# Patient Record
Sex: MALE | Race: WHITE | NOT HISPANIC OR LATINO | Employment: STUDENT | URBAN - METROPOLITAN AREA
[De-identification: names, ages, dates, MRNs, and addresses within clinical notes are randomized per-mention and may not be internally consistent; named-entity substitution may affect disease eponyms.]

---

## 2023-05-26 ENCOUNTER — OFFICE VISIT (OUTPATIENT)
Dept: PEDIATRICS | Facility: CLINIC | Age: 18
End: 2023-05-26
Payer: COMMERCIAL

## 2023-05-26 VITALS
WEIGHT: 200.2 LBS | HEART RATE: 59 BPM | BODY MASS INDEX: 30.89 KG/M2 | DIASTOLIC BLOOD PRESSURE: 71 MMHG | SYSTOLIC BLOOD PRESSURE: 109 MMHG

## 2023-05-26 DIAGNOSIS — E23.0 HYPOGONADOTROPIC HYPOGONADISM IN MALE (MULTI): Primary | ICD-10-CM

## 2023-05-26 PROBLEM — Q53.9 UNDESCENDED TESTIS: Status: ACTIVE | Noted: 2023-05-26

## 2023-05-26 PROBLEM — E78.00 ELEVATED LDL CHOLESTEROL LEVEL: Status: ACTIVE | Noted: 2023-05-26

## 2023-05-26 PROBLEM — R43.8 HYPOSMIA: Status: ACTIVE | Noted: 2023-05-26

## 2023-05-26 PROBLEM — Q53.10 UNDESCENDED RIGHT TESTICLE: Status: ACTIVE | Noted: 2023-05-26

## 2023-05-26 PROBLEM — M89.8X9 DELAYED BONE AGE DETERMINED BY X-RAY: Status: ACTIVE | Noted: 2023-05-26

## 2023-05-26 PROBLEM — F90.9 ADHD (ATTENTION DEFICIT HYPERACTIVITY DISORDER): Status: ACTIVE | Noted: 2023-05-26

## 2023-05-26 PROBLEM — E30.0 DELAYED PUBERTY: Status: ACTIVE | Noted: 2023-05-26

## 2023-05-26 PROBLEM — R93.7 DELAYED BONE AGE DETERMINED BY X-RAY: Status: ACTIVE | Noted: 2023-05-26

## 2023-05-26 PROCEDURE — 96372 THER/PROPH/DIAG INJ SC/IM: CPT | Performed by: NURSE PRACTITIONER

## 2023-05-26 PROCEDURE — 99212 OFFICE O/P EST SF 10 MIN: CPT | Performed by: NURSE PRACTITIONER

## 2023-05-26 RX ORDER — TESTOSTERONE CYPIONATE 200 MG/ML
INJECTION, SOLUTION INTRAMUSCULAR
COMMUNITY
Start: 2022-05-27

## 2023-05-26 RX ORDER — METHYLPHENIDATE HYDROCHLORIDE 18 MG/1
TABLET ORAL
COMMUNITY
Start: 2013-11-10 | End: 2023-10-30 | Stop reason: ALTCHOICE

## 2023-05-26 RX ORDER — TESTOSTERONE CYPIONATE 200 MG/ML
200 INJECTION, SOLUTION INTRAMUSCULAR ONCE
Status: COMPLETED | OUTPATIENT
Start: 2023-05-26 | End: 2023-05-26

## 2023-05-26 RX ORDER — TESTOSTERONE CYPIONATE 200 MG/ML
INJECTION, SOLUTION INTRAMUSCULAR
COMMUNITY
Start: 2020-11-16

## 2023-05-26 RX ORDER — LEVOTHYROXINE SODIUM 75 UG/1
TABLET ORAL
COMMUNITY
Start: 2023-05-25 | End: 2024-01-09 | Stop reason: DRUGHIGH

## 2023-05-26 RX ADMIN — TESTOSTERONE CYPIONATE 200 MG: 200 INJECTION, SOLUTION INTRAMUSCULAR at 14:06

## 2023-05-26 NOTE — PROGRESS NOTES
Subjective     Homero Ramsey is a 18 y.o. male who presents for Immunizations (18 yr old here for testosterone injection).  Today he is accompanied by alone.     HPI  Patient is here today for testosterone   Patient received 200 mg/1 mL  Patient brought own medication    Review of Systems  ROS negative for General, Eyes, ENT, Cardiovascular, GI, , Ortho, Derm, Neuro, Psych, Lymph unless noted in the HPI above.     Objective   /71   Pulse 59   Wt 90.8 kg (200 lb 3.2 oz)   BMI 30.89 kg/m²   BSA: 2.08 meters squared  Growth percentiles: Facility age limit for growth %david is 18 years. Facility age limit for growth %david is 18 years.     Physical Exam  General: Well-developed, well-nourished, alert and oriented, no acute distress  Eyes: Normal sclera, PERRLA, EOMI  ENT: Normal throat, no nasal discharge, TM's appear normal.  Cardiac: Regular rate and rhythm, normal S1/S2, no murmurs.  Pulmonary: Clear to auscultation bilaterally, no work of breathing.  GI: Soft nondistended nontender abdomen without rebound or guarding.  Skin: No rashes     Assessment/Plan   Diagnoses and all orders for this visit:  Hypogonadotropic hypogonadism in male (CMS/HCC)  -     testosterone cypionate (Depo-Testosterone) injection 200 mg      VICENTE Maloney-CNP

## 2023-06-16 ENCOUNTER — OFFICE VISIT (OUTPATIENT)
Dept: PEDIATRICS | Facility: CLINIC | Age: 18
End: 2023-06-16
Payer: COMMERCIAL

## 2023-06-16 VITALS
BODY MASS INDEX: 31.36 KG/M2 | HEART RATE: 67 BPM | WEIGHT: 203.2 LBS | SYSTOLIC BLOOD PRESSURE: 123 MMHG | DIASTOLIC BLOOD PRESSURE: 70 MMHG

## 2023-06-16 DIAGNOSIS — E23.0 HYPOGONADOTROPIC HYPOGONADISM IN MALE (MULTI): Primary | ICD-10-CM

## 2023-06-16 PROCEDURE — 96372 THER/PROPH/DIAG INJ SC/IM: CPT | Performed by: NURSE PRACTITIONER

## 2023-06-16 RX ORDER — TESTOSTERONE CYPIONATE 200 MG/ML
200 INJECTION, SOLUTION INTRAMUSCULAR ONCE
Status: COMPLETED | OUTPATIENT
Start: 2023-06-16 | End: 2023-06-16

## 2023-06-16 RX ADMIN — TESTOSTERONE CYPIONATE 200 MG: 200 INJECTION, SOLUTION INTRAMUSCULAR at 14:08

## 2023-06-16 NOTE — PROGRESS NOTES
Subjective     Homero Ramsey is a 18 y.o. male who presents for Testosterone injection (18 yr old here for testosterone injection. Bring medicine from pharmacy.).  Today he is accompanied by alone.     HPI  Patient here for testosterone injection as directed by endocrinology  Patient brought medication with him - 200 mg/1 mL  Medication verified and administered per order    Review of Systems  ROS negative for General, Eyes, ENT, Cardiovascular, GI, , Ortho, Derm, Neuro, Psych, Lymph unless noted in the HPI above.     Objective   /70   Pulse 67   Wt 92.2 kg (203 lb 3.2 oz)   BMI 31.36 kg/m²   BSA: 2.1 meters squared  Growth percentiles: Facility age limit for growth %david is 20 years. Facility age limit for growth %david is 18 years.     Physical Exam  General: Well-developed, well-nourished, alert and oriented, no acute distress  Cardiac: Regular rate and rhythm, normal S1/S2, no murmurs.  Pulmonary: Clear to auscultation bilaterally, no work of breathing.    Assessment/Plan   Diagnoses and all orders for this visit:  Hypogonadotropic hypogonadism in male (CMS/[facility])  -     testosterone cypionate (Depo-Testosterone) injection 200 mg      Becky Antunez, APRN-CNP

## 2023-07-07 ENCOUNTER — OFFICE VISIT (OUTPATIENT)
Dept: PEDIATRICS | Facility: CLINIC | Age: 18
End: 2023-07-07
Payer: COMMERCIAL

## 2023-07-07 VITALS
BODY MASS INDEX: 30.79 KG/M2 | DIASTOLIC BLOOD PRESSURE: 65 MMHG | WEIGHT: 201.5 LBS | HEART RATE: 75 BPM | SYSTOLIC BLOOD PRESSURE: 105 MMHG

## 2023-07-07 DIAGNOSIS — E23.0 HYPOGONADOTROPIC HYPOGONADISM IN MALE (MULTI): Primary | ICD-10-CM

## 2023-07-07 PROCEDURE — 99212 OFFICE O/P EST SF 10 MIN: CPT | Performed by: NURSE PRACTITIONER

## 2023-07-07 PROCEDURE — 96372 THER/PROPH/DIAG INJ SC/IM: CPT | Performed by: NURSE PRACTITIONER

## 2023-07-07 RX ORDER — TESTOSTERONE CYPIONATE 200 MG/ML
200 INJECTION, SOLUTION INTRAMUSCULAR ONCE
Status: COMPLETED | OUTPATIENT
Start: 2023-07-07 | End: 2023-07-07

## 2023-07-07 RX ADMIN — TESTOSTERONE CYPIONATE 200 MG: 200 INJECTION, SOLUTION INTRAMUSCULAR at 12:08

## 2023-07-07 NOTE — PROGRESS NOTES
Subjective     Homero Ramsey is a 18 y.o. male who presents for Testosterone injection (18 yr old here for testosterone injection. Brought medicine from pharmacy to be administered; 200 mg/1 mL per order/).  Today he is accompanied by alone.     HPI  Patient is here for testosterone injection  He brought medication with him  200 mg/1 mL given per endocrine order - tolerated well  Return in 3 weeks for next injection    Review of Systems  ROS negative for General, Eyes, ENT, Cardiovascular, GI, , Ortho, Derm, Neuro, Psych, Lymph unless noted in the HPI above.     Objective   /65   Pulse 75   Wt 91.4 kg (201 lb 8 oz)   BMI 30.79 kg/m²   BSA: 2.09 meters squared  Growth percentiles: Facility age limit for growth %david is 20 years. Facility age limit for growth %david is 18 years.     Physical Exam  General: Well-developed, well-nourished, alert and oriented, no acute distress  Cardiac: Regular rate and rhythm, normal S1/S2, no murmurs.  Pulmonary: Clear to auscultation bilaterally, no work of breathing.    Assessment/Plan   Diagnoses and all orders for this visit:  Hypogonadotropic hypogonadism in male (CMS/[facility])  -     testosterone cypionate (Depo-Testosterone) injection 200 mg      Becky Antunez, VICENTE-CNP

## 2023-07-25 ENCOUNTER — OFFICE VISIT (OUTPATIENT)
Dept: PEDIATRICS | Facility: CLINIC | Age: 18
End: 2023-07-25
Payer: COMMERCIAL

## 2023-07-25 VITALS
SYSTOLIC BLOOD PRESSURE: 121 MMHG | HEART RATE: 73 BPM | DIASTOLIC BLOOD PRESSURE: 77 MMHG | TEMPERATURE: 97.8 F | WEIGHT: 206.4 LBS | BODY MASS INDEX: 31.54 KG/M2

## 2023-07-25 DIAGNOSIS — E23.0 HYPOGONADOTROPIC HYPOGONADISM IN MALE (MULTI): Primary | ICD-10-CM

## 2023-07-25 DIAGNOSIS — E30.0 DELAYED PUBERTY: ICD-10-CM

## 2023-07-25 PROCEDURE — 99212 OFFICE O/P EST SF 10 MIN: CPT | Performed by: NURSE PRACTITIONER

## 2023-07-25 PROCEDURE — 96372 THER/PROPH/DIAG INJ SC/IM: CPT | Performed by: NURSE PRACTITIONER

## 2023-07-25 RX ORDER — TESTOSTERONE CYPIONATE 200 MG/ML
200 INJECTION, SOLUTION INTRAMUSCULAR ONCE
Status: COMPLETED | OUTPATIENT
Start: 2023-07-25 | End: 2023-07-25

## 2023-07-25 RX ADMIN — TESTOSTERONE CYPIONATE 200 MG: 200 INJECTION, SOLUTION INTRAMUSCULAR at 10:34

## 2023-07-25 NOTE — PROGRESS NOTES
Subjective     Homero Ramsey is a 18 y.o. male who presents for Immunizations (Patient is 18 yrs old here for Testosterone shot ).  Today he is accompanied by alone.     HPI  Patient here today for testosterone injection  200 mg/1 mL given per order from endocrine  Patient brought own medication  Medication and dosage verified    Review of Systems  ROS negative for General, Eyes, ENT, Cardiovascular, GI, , Ortho, Derm, Neuro, Psych, Lymph unless noted in the HPI above.     Objective   /77   Pulse 73   Temp 36.6 °C (97.8 °F)   Wt 93.6 kg (206 lb 6.4 oz)   BMI 31.54 kg/m²   BSA: 2.12 meters squared  Growth percentiles: Facility age limit for growth %david is 20 years. Facility age limit for growth %david is 20 years.     Physical Exam  General: Well-developed, well-nourished, alert and oriented, no acute distress  Cardiac: Regular rate and rhythm, normal S1/S2, no murmurs.  Pulmonary: Clear to auscultation bilaterally, no work of breathing.    Assessment/Plan   Diagnoses and all orders for this visit:  Hypogonadotropic hypogonadism in male (CMS/HCC)  -     testosterone cypionate (Depo-Testosterone) injection 200 mg  Delayed puberty      Becky Antunez, APRN-CNP

## 2023-08-15 ENCOUNTER — OFFICE VISIT (OUTPATIENT)
Dept: PEDIATRICS | Facility: CLINIC | Age: 18
End: 2023-08-15
Payer: COMMERCIAL

## 2023-08-15 DIAGNOSIS — E23.0 HYPOGONADOTROPIC HYPOGONADISM IN MALE (MULTI): ICD-10-CM

## 2023-08-15 PROCEDURE — 96372 THER/PROPH/DIAG INJ SC/IM: CPT | Performed by: NURSE PRACTITIONER

## 2023-08-15 PROCEDURE — 99213 OFFICE O/P EST LOW 20 MIN: CPT | Performed by: NURSE PRACTITIONER

## 2023-08-15 RX ORDER — TESTOSTERONE CYPIONATE 200 MG/ML
200 INJECTION, SOLUTION INTRAMUSCULAR ONCE
Status: COMPLETED | OUTPATIENT
Start: 2023-08-15 | End: 2023-08-15

## 2023-08-15 RX ADMIN — TESTOSTERONE CYPIONATE 200 MG: 200 INJECTION, SOLUTION INTRAMUSCULAR at 17:07

## 2023-10-27 ENCOUNTER — DOCUMENTATION (OUTPATIENT)
Dept: PEDIATRIC ENDOCRINOLOGY | Facility: CLINIC | Age: 18
End: 2023-10-27
Payer: COMMERCIAL

## 2023-10-27 NOTE — PROGRESS NOTES
Max had labs done at [facility] (see scanned)    Lipid panel is normal aside from LDL-123. This is acceptable    CBC and CMP generally normal.    AM cortisol reassuring at 16.8    TSH normal at 0.66 but free T4 not done.     Testosterone is 973 (high) however he had labs done 10/12 and likely just received his injection.     Radha Beck MD

## 2023-10-30 DIAGNOSIS — E03.8 CENTRAL HYPOTHYROIDISM: Primary | ICD-10-CM

## 2023-12-13 ENCOUNTER — TELEPHONE (OUTPATIENT)
Dept: PEDIATRICS | Facility: CLINIC | Age: 18
End: 2023-12-13
Payer: COMMERCIAL

## 2023-12-13 NOTE — TELEPHONE ENCOUNTER
Homero is scheduled for a testosterone shot on 1-2-24  but it fell on to the reschedule list as it's the day after a holiday. OK to keep on the schedule or do you want me to reschedule?  Thx!

## 2023-12-19 ENCOUNTER — OFFICE VISIT (OUTPATIENT)
Dept: PEDIATRICS | Facility: CLINIC | Age: 18
End: 2023-12-19
Payer: COMMERCIAL

## 2023-12-19 VITALS
DIASTOLIC BLOOD PRESSURE: 72 MMHG | HEIGHT: 69 IN | SYSTOLIC BLOOD PRESSURE: 126 MMHG | HEART RATE: 66 BPM | BODY MASS INDEX: 30.81 KG/M2 | WEIGHT: 208 LBS

## 2023-12-19 DIAGNOSIS — E23.0 HYPOGONADOTROPIC HYPOGONADISM IN MALE (MULTI): Primary | ICD-10-CM

## 2023-12-19 PROCEDURE — 96372 THER/PROPH/DIAG INJ SC/IM: CPT | Performed by: NURSE PRACTITIONER

## 2023-12-19 PROCEDURE — 99212 OFFICE O/P EST SF 10 MIN: CPT | Performed by: NURSE PRACTITIONER

## 2023-12-19 RX ORDER — TESTOSTERONE CYPIONATE 200 MG/ML
200 INJECTION, SOLUTION INTRAMUSCULAR ONCE
Status: COMPLETED | OUTPATIENT
Start: 2023-12-19 | End: 2024-01-02

## 2023-12-19 RX ADMIN — TESTOSTERONE CYPIONATE 200 MG: 200 INJECTION, SOLUTION INTRAMUSCULAR at 09:45

## 2023-12-19 NOTE — PROGRESS NOTES
"Subjective     Homero Ramsey is a 18 y.o. male who presents for Testosterone Injection (Testosterone Injection/ Patient here by Himself).  Today he is accompanied by alone.     HPI  Patient here for testosterone injection  200 mg/1 mL - patient brought medication  Medication verified    Review of Systems  ROS negative for General, Eyes, ENT, Cardiovascular, GI, , Ortho, Derm, Neuro, Psych, Lymph unless noted in the HPI above.     Objective   /72   Pulse 66   Ht 1.74 m (5' 8.5\")   Wt 94.3 kg (208 lb)   BMI 31.17 kg/m²   BSA: 2.13 meters squared  Growth percentiles: Facility age limit for growth %david is 20 years. Facility age limit for growth %david is 18 years.     Physical Exam  General: Well-developed, well-nourished, alert and oriented, no acute distress  Cardiac: Regular rate and rhythm, normal S1/S2, no murmurs.  Pulmonary: Clear to auscultation bilaterally, no work of breathing.    Assessment/Plan   Diagnoses and all orders for this visit:  Hypogonadotropic hypogonadism in male (CMS/[facility])  -     testosterone cypionate (Depo-Testosterone) injection 200 mg      VICENTE Maloney-CNP   "

## 2023-12-28 LAB — NON-UH HIE FREE T4: 0.91 NG/DL (ref 0.89–1.76)

## 2024-01-02 ENCOUNTER — OFFICE VISIT (OUTPATIENT)
Dept: PEDIATRICS | Facility: CLINIC | Age: 19
End: 2024-01-02
Payer: COMMERCIAL

## 2024-01-02 VITALS
HEART RATE: 80 BPM | HEIGHT: 68 IN | BODY MASS INDEX: 31.37 KG/M2 | WEIGHT: 207 LBS | SYSTOLIC BLOOD PRESSURE: 131 MMHG | DIASTOLIC BLOOD PRESSURE: 70 MMHG

## 2024-01-02 DIAGNOSIS — E23.0 HYPOGONADOTROPIC HYPOGONADISM IN MALE (MULTI): Primary | ICD-10-CM

## 2024-01-02 DIAGNOSIS — E30.0 DELAYED PUBERTY: ICD-10-CM

## 2024-01-02 PROCEDURE — 96372 THER/PROPH/DIAG INJ SC/IM: CPT | Performed by: NURSE PRACTITIONER

## 2024-01-02 RX ORDER — TESTOSTERONE CYPIONATE 200 MG/ML
200 INJECTION, SOLUTION INTRAMUSCULAR ONCE
Status: COMPLETED | OUTPATIENT
Start: 2024-01-02 | End: 2024-01-02

## 2024-01-02 RX ADMIN — TESTOSTERONE CYPIONATE 200 MG: 200 INJECTION, SOLUTION INTRAMUSCULAR at 10:26

## 2024-01-02 RX ADMIN — TESTOSTERONE CYPIONATE 200 MG: 200 INJECTION, SOLUTION INTRAMUSCULAR at 10:22

## 2024-01-02 NOTE — PROGRESS NOTES
Patient her for testosterone injection.  Patient brought own medication.  200 mg of testosterone given per order.

## 2024-01-08 NOTE — PROGRESS NOTES
"Subjective   Homero Ramsey is a 18 y.o. 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 He also has OBESITY (BMI 30).    Initial History:   Vijay presented at age 15yrs with no signs of puberty. He has a h/o right undescended testis. Height was at 1%le at presentation (MPH 5'8.5\") with BA 14yrs at CA 15yrs (PAH 5'9.5\"). FMH significant for late bloomers. Brain MRI showed a [id number] cyst in the pituitary gland and retrocerebellar signal collection. He was referred to Dr. Sanchez who recommended repeat MRI in spring-summer 2022. Vijay also saw Dr. Peres who performed orchiopexy in June 2020. He saw Dr. 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      In May 2023, he was started on levothyroxine 75mcg daily for persistently low free T4.    Interval History:   - no recent illnesses  - no issues with meds  - getting shots at Dr. Antunez's office or [facility]    Testosterone 200mg IM every 2 weeks.   - testosterone is holding him to the next dose; no symptoms of low energy before dose is due    Levothyroxine 75mcg daily  - usually takes in morning; if forgets takes two the next day  - waits 30 minutes  - no skin or hair changes  - no constipation     Labs 12/29:   Free T4 0.91    Adrenal Insufficiency Risk:  - no orthostatic hypotension  - not often adrenal insufficiency     Wt up a few lbs, discussed diet/activity    Diet; Trying to avoid chips and candy; avoiding fast food. Not too much soda or juice.     Discussed testicular self-exams, discussed transition after college    SOC: plays video games; on break from hospitals; third year at \[employer]"; wants to go work as an .     ROS: no fever, headache, chest pain, " "trouble breathing, abdominal pain, constipation, diarrhea, skin issues, joint pain  Sleep is okay     Objective   /61 (BP Location: Left arm, Patient Position: Sitting, BP Cuff Size: Large adult)   Ht 1.731 m (5' 8.15\")   Wt 95.6 kg (210 lb 12.2 oz)   BMI 31.91 kg/m²     Physical Exam:  Physical Exam  General: well appearing male in no distress  HEENT: normocephalic, atraumatic, non-dysmorphic  Teeth: good dentition  Thyroid: non-enlarged thyroid gland with no masses, no cervical lymphadenopathy  Neck: no acanthosis  CV: Normal S1, S2, Regular rate and rhythm  Resp: non-labored breathing, clear to auscultation  Abdomen: soft, non tender, no organomegaly   : deferred today  Skin: + rash on hands, dry skin with erythematous plaques eczematoid  Neuro: normal tone, grossly normal movements  Muscular: normal bulk    Labs:  Labs 12/29:   Free T4 0.91    Prior labs 10/24/23 (see below snapshot of key labs)             Assessment/Plan   Assessment:  Homero Ramsey is a 18 y.o. 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 He had dyslipidemia that improved after starting levothyroxine. His testosterone dose is appropriate, but free T4 remains low on levothyroxine 75mcg daily so will increase this dose. Central hypothyroidism is not classically described with ANOS1 gene variant, so monitoring for other pituitary hormone deficiencies is merited.  Vijay describes no symptoms to suggest adrenal insufficiency.     Plan:   Kallman syndrome (CMS/HCC)  Hypogonadotropic hypogonadism in male (CMS/HCC)  -    continue IM testosterone 200mg every 2 weeks  -     Testosterone,Total, MS; Future  Central hypothyroidism  -     Thyroxine, Free in 1 month after starting new dose for 1 month  -     levothyroxine (Synthroid, Levoxyl) 100 mcg tablet; Take 1 tablet (100 mcg) by mouth once daily.         -    follow up in 6 mos      Radha ALFONSO" MD [place]

## 2024-01-09 ENCOUNTER — OFFICE VISIT (OUTPATIENT)
Dept: PEDIATRIC ENDOCRINOLOGY | Facility: CLINIC | Age: 19
End: 2024-01-09
Payer: COMMERCIAL

## 2024-01-09 VITALS
HEIGHT: 68 IN | WEIGHT: 210.76 LBS | BODY MASS INDEX: 31.94 KG/M2 | SYSTOLIC BLOOD PRESSURE: 108 MMHG | DIASTOLIC BLOOD PRESSURE: 61 MMHG

## 2024-01-09 DIAGNOSIS — R43.8 HYPOSMIA: ICD-10-CM

## 2024-01-09 DIAGNOSIS — E03.8 CENTRAL HYPOTHYROIDISM: ICD-10-CM

## 2024-01-09 DIAGNOSIS — E23.0 HYPOGONADOTROPIC HYPOGONADISM IN MALE (MULTI): ICD-10-CM

## 2024-01-09 DIAGNOSIS — E23.0 KALLMAN SYNDROME (MULTI): Primary | ICD-10-CM

## 2024-01-09 PROCEDURE — 99214 OFFICE O/P EST MOD 30 MIN: CPT | Performed by: PEDIATRICS

## 2024-01-09 RX ORDER — LEVOTHYROXINE SODIUM 100 UG/1
100 TABLET ORAL DAILY
Qty: 30 TABLET | Refills: 11 | Status: SHIPPED | OUTPATIENT
Start: 2024-01-09 | End: 2024-01-15 | Stop reason: SDUPTHER

## 2024-01-09 RX ORDER — LEVOTHYROXINE SODIUM 100 UG/1
100 TABLET ORAL DAILY
Qty: 30 TABLET | Refills: 11 | Status: SHIPPED | OUTPATIENT
Start: 2024-01-09 | End: 2024-01-09 | Stop reason: SDUPTHER

## 2024-01-09 NOTE — PATIENT INSTRUCTIONS
Continue Testosterone 200mg IM every 2 weeks    Increase levothyroxine to 100mcg daily. Check thyroid labs in 1 month    Watch out for symptoms of adrenal insufficiency- morning stomach aches, low blood sugar, feeling dizzy a lot. Call me if you have these symptoms.     Contact Information for Pediatric Endocrinology:   Daytime Number: [phone]  Night/Weekend (emergencies): [phone]  Please do not send my-chart messages for urgent issues    Follow up in 6 months    Planning transition to adult care after graduation once you know where you will be living

## 2024-01-15 DIAGNOSIS — E03.8 CENTRAL HYPOTHYROIDISM: ICD-10-CM

## 2024-01-15 RX ORDER — LEVOTHYROXINE SODIUM 100 UG/1
100 TABLET ORAL DAILY
Qty: 90 TABLET | Refills: 3 | Status: SHIPPED | OUTPATIENT
Start: 2024-01-15 | End: 2025-01-14

## 2024-02-12 DIAGNOSIS — E23.0 KALLMAN SYNDROME (MULTI): ICD-10-CM

## 2024-02-12 RX ORDER — TESTOSTERONE CYPIONATE 200 MG/ML
200 INJECTION, SOLUTION INTRAMUSCULAR
Qty: 6 ML | Refills: 0 | Status: SHIPPED | OUTPATIENT
Start: 2024-02-12 | End: 2024-02-20 | Stop reason: SDUPTHER

## 2024-02-20 DIAGNOSIS — E23.0 KALLMAN SYNDROME (MULTI): ICD-10-CM

## 2024-02-20 RX ORDER — TESTOSTERONE CYPIONATE 200 MG/ML
200 INJECTION, SOLUTION INTRAMUSCULAR
Qty: 6 ML | Refills: 0 | Status: SHIPPED | OUTPATIENT
Start: 2024-02-20 | End: 2024-02-22 | Stop reason: SDUPTHER

## 2024-02-22 RX ORDER — TESTOSTERONE CYPIONATE 200 MG/ML
200 INJECTION, SOLUTION INTRAMUSCULAR
Qty: 6 ML | Refills: 0 | Status: SHIPPED | OUTPATIENT
Start: 2024-02-22 | End: 2024-03-22 | Stop reason: SDUPTHER

## 2024-03-22 DIAGNOSIS — E23.0 KALLMAN SYNDROME (MULTI): ICD-10-CM

## 2024-03-22 RX ORDER — TESTOSTERONE CYPIONATE 200 MG/ML
200 INJECTION, SOLUTION INTRAMUSCULAR
Qty: 6 ML | Refills: 0 | Status: SHIPPED | OUTPATIENT
Start: 2024-03-22 | End: 2024-06-20

## 2024-03-26 ENCOUNTER — OFFICE VISIT (OUTPATIENT)
Dept: PEDIATRICS | Facility: CLINIC | Age: 19
End: 2024-03-26
Payer: COMMERCIAL

## 2024-03-26 VITALS
SYSTOLIC BLOOD PRESSURE: 124 MMHG | DIASTOLIC BLOOD PRESSURE: 74 MMHG | HEIGHT: 68 IN | HEART RATE: 74 BPM | WEIGHT: 206 LBS | BODY MASS INDEX: 31.22 KG/M2

## 2024-03-26 DIAGNOSIS — Z00.00 WELLNESS EXAMINATION: ICD-10-CM

## 2024-03-26 DIAGNOSIS — E30.0 DELAYED PUBERTY: Primary | ICD-10-CM

## 2024-03-26 PROCEDURE — 96372 THER/PROPH/DIAG INJ SC/IM: CPT | Performed by: NURSE PRACTITIONER

## 2024-03-26 PROCEDURE — 99395 PREV VISIT EST AGE 18-39: CPT | Performed by: NURSE PRACTITIONER

## 2024-03-26 PROCEDURE — 3008F BODY MASS INDEX DOCD: CPT | Performed by: NURSE PRACTITIONER

## 2024-03-26 PROCEDURE — 1036F TOBACCO NON-USER: CPT | Performed by: NURSE PRACTITIONER

## 2024-03-26 RX ORDER — TESTOSTERONE CYPIONATE 200 MG/ML
200 INJECTION, SOLUTION INTRAMUSCULAR ONCE
Status: COMPLETED | OUTPATIENT
Start: 2024-03-26 | End: 2024-03-26

## 2024-03-26 RX ADMIN — TESTOSTERONE CYPIONATE 200 MG: 200 INJECTION, SOLUTION INTRAMUSCULAR at 09:43

## 2024-03-26 NOTE — PROGRESS NOTES
"Concerns: None    Sleep: well rested and waking up well in the morning   Diet:  offering a variety of food groups; fruits and vegetables; protein  [place]:  soft and regular; good urine output  Dental:  brushing twice a day and seeing dentist  School:   [employer] - Studying computer engineering  Activities: Staying active  Drugs/Alcohol/Tobacco/Vaping: discussed   Sexuality/Puberty: discussed     Exam:       height is 1.734 m (5' 8.25\") and weight is 93.4 kg (206 lb). His blood pressure is 124/74 and his pulse is 74.     General: Well-developed, well-nourished, alert and oriented, no acute distress  Eyes: Normal sclera, HARSH, EOMI. Red reflex intact, light reflex symmetric.   ENT: Moist mucous membranes, normal throat, no nasal discharge. TMs are normal.  Cardiac:  Normal S1/S2, regular rhythm. Capillary refill less than 2 seconds. No clinically significant murmurs.    Pulmonary: Clear to auscultation bilaterally, no work of breathing.  GI: Soft nontender nondistended abdomen, no HSM, no masses.    Skin: No specific or unusual rashes  Neuro: Symmetric face, no ataxia, grossly normal strength.  Lymph and Neck: No lymphadenopathy, no visible thyroid swelling.  Orthopedic:  normal range of motion of shoulders and normal duck walk, normal spine/no scoliosis  :  not examined, discussed self exams.      Problem List Items Addressed This Visit             ICD-10-CM    Delayed puberty - Primary E30.0    Relevant Medications    testosterone cypionate (Depo-Testosterone) injection 200 mg (Completed) (Start on 3/26/2024 10:00 AM)     Other Visit Diagnoses         Codes    Wellness examination     Z00.00    BMI 31.0-31.9,adult     Z68.31            Homero is growing and developing well.  Make sure to continue wearing seat belts and helmets for riding bikes or scooters.       Now that your child has been old enough to drive and may have a license, continue to set a good example by wearing your seat belt and not using " "your phone while driving.   Teen drivers should keep their phones out of reach or in the trunk so they are not tempted to use them while driving. Parents should review online safety for their adolescent children including privacy and over-sharing.  Keep watch your your child's online interactions with concerns for bullying or inappropriate posts.     Screen time (including TV, computer, tablets, phones) should be limited to 2 hours a day to encourage activity and allow for \"in-person\" social development.    We discussed physical activity and nutritional requirements today. Continue to return annually for a checkup and any necessary booster vaccines.    Testosterone injection given - 200 mg/1 mL as directed by endocrinology.    "

## 2024-05-21 ENCOUNTER — OFFICE VISIT (OUTPATIENT)
Dept: PEDIATRICS | Facility: CLINIC | Age: 19
End: 2024-05-21
Payer: COMMERCIAL

## 2024-05-21 VITALS
HEART RATE: 72 BPM | SYSTOLIC BLOOD PRESSURE: 121 MMHG | WEIGHT: 208 LBS | DIASTOLIC BLOOD PRESSURE: 72 MMHG | BODY MASS INDEX: 31.4 KG/M2

## 2024-05-21 DIAGNOSIS — E23.0 HYPOGONADOTROPIC HYPOGONADISM IN MALE (MULTI): Primary | ICD-10-CM

## 2024-05-21 PROCEDURE — 3008F BODY MASS INDEX DOCD: CPT | Performed by: NURSE PRACTITIONER

## 2024-05-21 PROCEDURE — 96372 THER/PROPH/DIAG INJ SC/IM: CPT | Performed by: NURSE PRACTITIONER

## 2024-05-21 PROCEDURE — 1036F TOBACCO NON-USER: CPT | Performed by: NURSE PRACTITIONER

## 2024-05-21 RX ORDER — TESTOSTERONE CYPIONATE 200 MG/ML
200 INJECTION, SOLUTION INTRAMUSCULAR ONCE
Status: COMPLETED | OUTPATIENT
Start: 2024-05-21 | End: 2024-05-21

## 2024-05-21 RX ADMIN — TESTOSTERONE CYPIONATE 200 MG: 200 INJECTION, SOLUTION INTRAMUSCULAR at 13:56

## 2024-05-21 NOTE — PROGRESS NOTES
Patient ID: Homero Ramsey is a 19 y.o. male.    Procedures  Patient here for testosterone injection - 200 mg/1 mL  Provided own medication

## 2024-06-27 LAB
NON-UH HIE FREE T4: 1.23 NG/DL (ref 0.89–1.76)
NON-UH HIE TESTOS TOT: 493 NG/DL (ref 240–1000)

## 2024-06-30 NOTE — RESULT ENCOUNTER NOTE
Labs scanned from [facility] dated 6/27/24:  Free T4 1.23 (0.89-1.76)  Testosterone 493mg/dL     Please continue levothyroxine 100mcg daily and testosterone 200mg Intra-muscular every 2 weeks.     Follow up is scheduled for 8/6.

## 2024-07-03 NOTE — RESULT ENCOUNTER NOTE
Delayed results in Epic, previously reviewed scanned copy from [facility]    Labs scanned from [facility] dated 6/27/24:  Free T4 1.23 (0.89-1.76)  Testosterone 493mg/dL    Please continue levothyroxine 100mcg daily and testosterone 200mg Intra-muscular every 2 weeks.    Follow up is scheduled for 8/6.

## 2024-07-16 DIAGNOSIS — E23.0 KALLMAN SYNDROME (MULTI): ICD-10-CM

## 2024-07-16 RX ORDER — TESTOSTERONE CYPIONATE 200 MG/ML
200 INJECTION, SOLUTION INTRAMUSCULAR
Qty: 6 ML | Refills: 0 | Status: SHIPPED | OUTPATIENT
Start: 2024-07-16 | End: 2024-10-14

## 2024-07-17 ENCOUNTER — OFFICE VISIT (OUTPATIENT)
Dept: PEDIATRICS | Facility: CLINIC | Age: 19
End: 2024-07-17
Payer: COMMERCIAL

## 2024-07-17 VITALS — BODY MASS INDEX: 32.45 KG/M2 | WEIGHT: 215 LBS

## 2024-07-17 DIAGNOSIS — E23.0 HYPOGONADOTROPIC HYPOGONADISM IN MALE (MULTI): Primary | ICD-10-CM

## 2024-07-17 PROCEDURE — 1036F TOBACCO NON-USER: CPT | Performed by: PEDIATRICS

## 2024-07-17 PROCEDURE — 96372 THER/PROPH/DIAG INJ SC/IM: CPT | Performed by: PEDIATRICS

## 2024-07-17 RX ORDER — TESTOSTERONE CYPIONATE 200 MG/ML
200 INJECTION, SOLUTION INTRAMUSCULAR ONCE
Status: COMPLETED | OUTPATIENT
Start: 2024-07-17 | End: 2024-07-17

## 2024-07-17 NOTE — PROGRESS NOTES
Subjective   Homero Herman a 19 y.o.malewho presents fortertosterone injection (Pt by himself for testosterone injection)  HPI    Here for injection    Objective   Wt 97.5 kg (215 lb) Comment: 215 lbs  BMI 32.45 kg/m²       Physical Exam            No visits with results within 10 Day(s) from this visit.   Latest known visit with results is:   Orders Only on 06/27/2024   Component Date Value Ref Range Status    [facility] HIE Free T4 06/27/2024 1.23  0.89 - 1.76 ng/dL Final    [facility] HIE TESTOS TOT 06/27/2024 493  240 - 1,000 ng/dL Final         Assessment/Plan   Diagnoses and all orders for this visit:  Hypogonadotropic hypogonadism in male (Multi)  -     testosterone cypionate (Depo-Testosterone) injection 200 mg

## 2024-07-30 ENCOUNTER — APPOINTMENT (OUTPATIENT)
Dept: PEDIATRICS | Facility: CLINIC | Age: 19
End: 2024-07-30
Payer: COMMERCIAL

## 2024-07-30 VITALS
WEIGHT: 215 LBS | BODY MASS INDEX: 32.45 KG/M2 | HEART RATE: 70 BPM | SYSTOLIC BLOOD PRESSURE: 133 MMHG | DIASTOLIC BLOOD PRESSURE: 77 MMHG

## 2024-07-30 DIAGNOSIS — E23.0 HYPOGONADOTROPIC HYPOGONADISM IN MALE (MULTI): Primary | ICD-10-CM

## 2024-07-30 PROCEDURE — 96372 THER/PROPH/DIAG INJ SC/IM: CPT | Performed by: NURSE PRACTITIONER

## 2024-07-30 RX ORDER — TESTOSTERONE CYPIONATE 200 MG/ML
200 INJECTION, SOLUTION INTRAMUSCULAR ONCE
Status: COMPLETED | OUTPATIENT
Start: 2024-07-30 | End: 2024-07-30

## 2024-07-30 NOTE — PROGRESS NOTES
Patient here for testosterone injection.  Medication provided by patient.  Tolerated well.  Continue follow up with endocrine as directed.

## 2024-08-05 NOTE — PROGRESS NOTES
"Subjective   Homero Ramsey is a 19 y.o. male who presents for Follow-up    Initial History:   Vijay presented at age 15yrs with no signs of puberty. He has a h/o right undescended testis. Height was at 1%le at presentation (MPH 5'8.5\") with BA 14yrs at [place] 15yrs (PAH 5'9.5\"). FMH significant for late bloomers. Brain MRI showed a [id number] cyst in the pituitary gland and retrocerebellar signal collection. He was referred to Dr. Sanchez who recommended repeat MRI in spring-summer 2022. Vijay also saw Dr. Peres who performed orchiopexy in June 2020. He saw Dr. 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      In May 2023, he was started on levothyroxine for persistently low free T4. His dyslipidemia improved after starting levothyroxine suggesting he truly did have central hypothyroidism.     Interval History:   Currently taking testosterone shots at Dr. Edwards's office.   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     CENTRAL HYPOGONADISM:  Takes 200mg IM every two weeks.   - no issues getting shots   - needs a refill (bunny [facility] this month); then [facility]d supply [facility] after that.   - doses seem to be holding him as he is not fatigued or low energy when next shot is due    CENTRAL HYPOTHYROIDISM:   levothyroxine 100mcg daily  - misses dose once per week on average   - no hair loss, no dry skin, not constipated    No PU or PD   No AM vomiting or extreme fatigue     Labs scanned from [facility] dated 6/27/24:  Free T4 1.23 (0.89-1.76)  Testosterone 493mg/dL     SOC: Senior this year; will start looking for a full time job. Thinking of working with IT.     Discussed transition to adult endo after he gets a job to ensure location and insurance are finalized.     ROS: no fever, headache, chest pain, trouble breathing, abdominal pain, constipation, diarrhea, skin issues, joint " "pain     Objective   /70 (BP Location: Left arm, Patient Position: Sitting, BP Cuff Size: Large adult)   Pulse 60   Ht 1.735 m (5' 8.31\")   Wt 96.9 kg (213 lb 10 oz)   BMI 32.19 kg/m²     Physical Exam:  Physical Exam  General: well appearing male in no distress  HEENT: normocephalic, atraumatic, non-dysmorphic  Thyroid: non-enlarged thyroid gland with no masses, no cervical lymphadenopathy  Neck: no acanthosis  CV: Normal S1, S2, Regular rate and rhythm  Resp: non-labored breathing, clear to auscultation  Abdomen: soft, non tender, no organomegaly   : deferred; discussed testicular self exams   Skin: no rashes    Labs:  Lab Results   Component Value Date    TSH 1.14 08/16/2021    FREET4 1.04 10/13/2022    FREET4 0.86 08/16/2021    NONUHSWGH 1.23 06/27/2024    NONUHSWGH 493 06/27/2024    NONUHSWGH 0.91 12/28/2023     Testosterone 493mg/dL    Assessment/Plan   Assessment:  Homero Ramsey is a 19 y.o. male with HYPOGONADOTROPIC HYPOGONADISM and anosmia (Kallman Syndrome) due to a likely pathogenic variant in ANOS. He also has central hypothyroidism which is not common with this variant. He could be at risk for additional pituitary hormone deficiencies.     Vijay has not had reassessment of the pituitary cyst since diagnosis. There is a 2021 brain MRI but it did not have sellar cuts. I recommend repeat brain MRI in the next year to ensure no major change. Left Vijay message about this post-visit.     Plan:   Elevated LDL cholesterol level  -     Lipid Panel; Future  Kallman syndrome (Multi)  -     Testosterone,Total to be done before his next injection  -     testosterone 200mg IM every 2 weeks   Obesity (BMI 30-39.9); Central hypothyroidism  -     Thyroxine, Free; Future  -     Comprehensive Metabolic Panel; Future  -     Hemoglobin A1C; Future  -     levothyroxine (Synthroid, Levoxyl) 100 mcg tablet; Take 1 tablet (100 mcg) by mouth once daily.  Pituitary cyst       -    recommend repeat brain MRI prior to " transition to adult care    Radha Beck MD

## 2024-08-06 ENCOUNTER — OFFICE VISIT (OUTPATIENT)
Dept: PEDIATRIC ENDOCRINOLOGY | Facility: CLINIC | Age: 19
End: 2024-08-06
Payer: COMMERCIAL

## 2024-08-06 ENCOUNTER — APPOINTMENT (OUTPATIENT)
Dept: PEDIATRIC ENDOCRINOLOGY | Facility: CLINIC | Age: 19
End: 2024-08-06
Payer: COMMERCIAL

## 2024-08-06 VITALS
HEIGHT: 68 IN | WEIGHT: 213.63 LBS | BODY MASS INDEX: 32.38 KG/M2 | HEART RATE: 60 BPM | DIASTOLIC BLOOD PRESSURE: 70 MMHG | SYSTOLIC BLOOD PRESSURE: 103 MMHG

## 2024-08-06 DIAGNOSIS — E23.6 RATHKE'S CLEFT CYST (MULTI): ICD-10-CM

## 2024-08-06 DIAGNOSIS — E78.00 ELEVATED LDL CHOLESTEROL LEVEL: Primary | ICD-10-CM

## 2024-08-06 DIAGNOSIS — E30.0 DELAYED PUBERTY: Primary | ICD-10-CM

## 2024-08-06 DIAGNOSIS — E03.8 CENTRAL HYPOTHYROIDISM: ICD-10-CM

## 2024-08-06 DIAGNOSIS — E66.9 OBESITY (BMI 30-39.9): ICD-10-CM

## 2024-08-06 DIAGNOSIS — E23.0 KALLMAN SYNDROME (MULTI): ICD-10-CM

## 2024-08-06 PROCEDURE — 3008F BODY MASS INDEX DOCD: CPT | Performed by: PEDIATRICS

## 2024-08-06 PROCEDURE — 99215 OFFICE O/P EST HI 40 MIN: CPT | Performed by: PEDIATRICS

## 2024-08-06 RX ORDER — TESTOSTERONE CYPIONATE 200 MG/ML
200 INJECTION, SOLUTION INTRAMUSCULAR
Qty: 2 ML | Refills: 0 | Status: SHIPPED | OUTPATIENT
Start: 2024-08-06 | End: 2024-09-03

## 2024-08-06 RX ORDER — LEVOTHYROXINE SODIUM 100 UG/1
100 TABLET ORAL DAILY
Qty: 90 TABLET | Refills: 3 | Status: SHIPPED | OUTPATIENT
Start: 2024-08-06 | End: 2025-08-06

## 2024-08-06 RX ORDER — TESTOSTERONE CYPIONATE 200 MG/ML
200 INJECTION, SOLUTION INTRAMUSCULAR
Qty: 6 ML | Refills: 3 | Status: SHIPPED | OUTPATIENT
Start: 2024-09-06 | End: 2025-09-06

## 2024-09-09 DIAGNOSIS — E23.0 KALLMAN SYNDROME (MULTI): ICD-10-CM

## 2024-09-09 RX ORDER — TESTOSTERONE CYPIONATE 200 MG/ML
200 INJECTION, SOLUTION INTRAMUSCULAR
Qty: 2 ML | Refills: 0 | Status: SHIPPED | OUTPATIENT
Start: 2024-09-09 | End: 2024-09-11 | Stop reason: SDUPTHER

## 2024-09-11 DIAGNOSIS — E23.0 KALLMAN SYNDROME (MULTI): ICD-10-CM

## 2024-09-11 RX ORDER — TESTOSTERONE CYPIONATE 200 MG/ML
200 INJECTION, SOLUTION INTRAMUSCULAR
Qty: 2 ML | Refills: 0 | Status: SHIPPED | OUTPATIENT
Start: 2024-09-11 | End: 2024-10-09

## 2024-12-05 DIAGNOSIS — E03.8 CENTRAL HYPOTHYROIDISM: ICD-10-CM

## 2024-12-05 RX ORDER — LEVOTHYROXINE SODIUM 100 UG/1
100 TABLET ORAL DAILY
Qty: 90 TABLET | Refills: 3 | Status: SHIPPED | OUTPATIENT
Start: 2024-12-05 | End: 2025-12-05

## 2024-12-17 ENCOUNTER — HOSPITAL ENCOUNTER (OUTPATIENT)
Dept: RADIOLOGY | Facility: HOSPITAL | Age: 19
Discharge: HOME | End: 2024-12-17
Payer: COMMERCIAL

## 2024-12-17 DIAGNOSIS — E23.6 RATHKE'S CLEFT CYST (MULTI): ICD-10-CM

## 2024-12-17 PROCEDURE — A9575 INJ GADOTERATE MEGLUMI 0.1ML: HCPCS | Performed by: PEDIATRICS

## 2024-12-17 PROCEDURE — 70553 MRI BRAIN STEM W/O & W/DYE: CPT | Performed by: RADIOLOGY

## 2024-12-17 PROCEDURE — 2550000001 HC RX 255 CONTRASTS: Performed by: PEDIATRICS

## 2024-12-17 PROCEDURE — 70553 MRI BRAIN STEM W/O & W/DYE: CPT

## 2024-12-17 RX ORDER — GADOTERATE MEGLUMINE 376.9 MG/ML
18 INJECTION INTRAVENOUS
Status: COMPLETED | OUTPATIENT
Start: 2024-12-17 | End: 2024-12-17

## 2024-12-19 NOTE — RESULT ENCOUNTER NOTE
Brain MRI shows stable lesion which is more likely a rathke cleft cyst but cannot be definitively distinguished from a cystic pituitary adenoma. It is stable and not growing which is reassuring. Can discuss further at upcoming visit in January.

## 2024-12-23 ENCOUNTER — APPOINTMENT (OUTPATIENT)
Dept: PEDIATRICS | Facility: CLINIC | Age: 19
End: 2024-12-23
Payer: COMMERCIAL

## 2024-12-23 DIAGNOSIS — E23.0 HYPOGONADOTROPIC HYPOGONADISM IN MALE (MULTI): Primary | ICD-10-CM

## 2024-12-23 LAB
NON-UH HIE A/G RATIO: 1.5
NON-UH HIE ALB: 4.4 G/DL (ref 3.4–5)
NON-UH HIE ALK PHOS: 84 UNIT/L (ref 45–117)
NON-UH HIE BILIRUBIN, TOTAL: 0.6 MG/DL (ref 0.3–1.2)
NON-UH HIE BUN/CREAT RATIO: 20
NON-UH HIE BUN: 18 MG/DL (ref 9–23)
NON-UH HIE CALCIUM: 10.4 MG/DL (ref 8.7–10.4)
NON-UH HIE CALCULATED LDL CHOLESTEROL: 135 MG/DL (ref 60–130)
NON-UH HIE CALCULATED OSMOLALITY: 281 MOSM/KG (ref 275–295)
NON-UH HIE CHLORIDE: 105 MMOL/L (ref 98–107)
NON-UH HIE CHOLESTEROL: 205 MG/DL (ref 100–200)
NON-UH HIE CO2, VENOUS: 29 MMOL/L (ref 20–31)
NON-UH HIE CREATININE: 0.9 MG/DL (ref 0.6–1.1)
NON-UH HIE FREE T4: 1.31 NG/DL (ref 0.89–1.76)
NON-UH HIE GFR AA: >60
NON-UH HIE GLOBULIN: 3 G/DL
NON-UH HIE GLOMERULAR FILTRATION RATE: >60 ML/MIN/1.73M?
NON-UH HIE GLUCOSE: 90 MG/DL (ref 74–106)
NON-UH HIE GOT: 75 UNIT/L (ref 15–37)
NON-UH HIE GPT: 102 UNIT/L (ref 10–49)
NON-UH HIE HDL CHOLESTEROL: 47 MG/DL (ref 40–60)
NON-UH HIE HGB A1C: 5.1 %
NON-UH HIE K: 4.1 MMOL/L (ref 3.5–5.1)
NON-UH HIE NA: 140 MMOL/L (ref 135–145)
NON-UH HIE TOTAL CHOL/HDL CHOL RATIO: 4.4
NON-UH HIE TOTAL PROTEIN: 7.4 G/DL (ref 5.7–8.2)
NON-UH HIE TRIGLYCERIDES: 113 MG/DL (ref 30–150)

## 2024-12-23 PROCEDURE — 96372 THER/PROPH/DIAG INJ SC/IM: CPT | Performed by: NURSE PRACTITIONER

## 2024-12-23 RX ORDER — TESTOSTERONE CYPIONATE 200 MG/ML
200 INJECTION, SOLUTION INTRAMUSCULAR ONCE
Status: COMPLETED | OUTPATIENT
Start: 2024-12-23 | End: 2024-12-23

## 2024-12-23 NOTE — PROGRESS NOTES
Patient is here for testosterone injection.  Patient provided medication.  200 mg/1 mL administered as directed by endocrinology.  Patient tolerated well.

## 2024-12-28 LAB
NON-UH HIE TESTOS FREE ADULT MALE ED/LC-MS/MS: 8.1 PG/ML (ref 47–244)
NON-UH HIE TESTOSTERONE TOTAL, LC-MS/MS, MALES: 37.8 NG/DL (ref 300–1080)

## 2025-01-07 ENCOUNTER — APPOINTMENT (OUTPATIENT)
Dept: PEDIATRIC ENDOCRINOLOGY | Facility: CLINIC | Age: 20
End: 2025-01-07
Payer: COMMERCIAL

## 2025-01-07 VITALS
SYSTOLIC BLOOD PRESSURE: 127 MMHG | HEART RATE: 75 BPM | HEIGHT: 68 IN | WEIGHT: 215.17 LBS | DIASTOLIC BLOOD PRESSURE: 84 MMHG | BODY MASS INDEX: 32.61 KG/M2

## 2025-01-07 DIAGNOSIS — E23.0 HYPOGONADOTROPIC HYPOGONADISM IN MALE (MULTI): ICD-10-CM

## 2025-01-07 DIAGNOSIS — R43.8 HYPOSMIA: ICD-10-CM

## 2025-01-07 DIAGNOSIS — E23.0 KALLMAN SYNDROME (MULTI): ICD-10-CM

## 2025-01-07 DIAGNOSIS — E23.6 RATHKE'S CLEFT CYST (MULTI): Primary | ICD-10-CM

## 2025-01-07 DIAGNOSIS — E23.0 HYPOGONADOTROPIC HYPOGONADISM (MULTI): ICD-10-CM

## 2025-01-07 DIAGNOSIS — E78.00 ELEVATED LDL CHOLESTEROL LEVEL: ICD-10-CM

## 2025-01-07 DIAGNOSIS — E66.9 OBESITY (BMI 30-39.9): ICD-10-CM

## 2025-01-07 DIAGNOSIS — E03.8 CENTRAL HYPOTHYROIDISM: ICD-10-CM

## 2025-01-07 DIAGNOSIS — R74.8 ELEVATED LIVER ENZYMES: ICD-10-CM

## 2025-01-07 PROCEDURE — 3008F BODY MASS INDEX DOCD: CPT | Performed by: PEDIATRICS

## 2025-01-07 PROCEDURE — 99215 OFFICE O/P EST HI 40 MIN: CPT | Performed by: PEDIATRICS

## 2025-01-07 RX ORDER — TESTOSTERONE CYPIONATE 200 MG/ML
INJECTION, SOLUTION INTRAMUSCULAR
Qty: 12 ML | Refills: 3 | Status: SHIPPED | OUTPATIENT
Start: 2025-01-07

## 2025-01-07 RX ORDER — LEVOTHYROXINE SODIUM 100 UG/1
100 TABLET ORAL DAILY
Qty: 90 TABLET | Refills: 3 | Status: SHIPPED | OUTPATIENT
Start: 2025-01-07 | End: 2026-01-07

## 2025-01-07 ASSESSMENT — PAIN SCALES - GENERAL: PAINLEVEL_OUTOF10: 0-NO PAIN

## 2025-01-07 NOTE — PATIENT INSTRUCTIONS
Nice to see you Max!    Switch to subcutaneous testosterone (self-administered) 100mg every week    Continue levothyroxine 100mcg daily     Your liver enzyme levels were high at last check. It could be due to weight and something called hepatic steatosis or it could be due to a virus or some other cause. Please repeat labs tests in 1 month. If the liver tests remain high, I will order a liver ultrasound for you.     Follow up with Dr. Sanchez     GOALS:   - go to the gym 3 times per week with cardio   - increase vegetable intake to each meal     Follow up with me in 5-6 months

## 2025-01-07 NOTE — ASSESSMENT & PLAN NOTE
Most likely related to diet and obesity, set goals for diet and lifestyle change. Declined nutritionist visit today.

## 2025-01-07 NOTE — PROGRESS NOTES
"Subjective   Homero Ramsey is a 19 y.o. male who presents for Hypogonadism and Thyroid Problem    Initial History:   Vijay presented at age 15yrs with no signs of puberty. He has a h/o right undescended testis. Height was at 1%le at presentation (MPH 5'8.5\") with BA 14yrs at CA 15yrs (PAH 5'9.5\"). FMH significant for late bloomers. Brain MRI showed a [id number] cyst in the pituitary gland and retrocerebellar signal collection. He was referred to Dr. Sanchez who recommended repeat MRI in spring-summer 2022. Vijay also saw Dr. Peres who performed orchiopexy in June 2020. He saw Dr. 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      In May 2023, he was started on levothyroxine for persistently low free T4. His dyslipidemia improved after starting levothyroxine suggesting he truly did have central hypothyroidism.     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     - brain MRI done Dec 17th showed stable sub-centimeric area of lower signal in the posterior pituitary gland suggestive of a proteinaceous/ hemorrhagic rathke cleft cyst, but cannot r/o cystic pituitary adenoma. There is also stable prominence of the extra-axial space in the retro-cerebellar region suggesting cerebellar arachnoid cyst vs. Miguelito cisterna magna     HYPOGONADISM:  - Testosterone 200mg IM every 2 weeks last given 12/23  - testosterone done on 12/28 was 37.8ng/dL (done with [place] [facility] [place] - seems oddly low. Possibly an assay issue.  - energy is normal   - normal erections   - getting shots in the arms every 2 weeks. No missed doses  - discussed options of self-administering every 2 weeks  - comfortable giving subcutaneous  - can give in AM before going to class  - will give on Tuesdays consistently  - endo TANYA Valdes trained him on using an insulin syringe     THYROID:  levothyroxine 100mcg daily   Free " "T4 done 12/28 was 1.31  - estimates he forgets once every two weeks  - takes in AM on empty stomach  - no other medications at that time    ADRENAL:   - not having low blood sugars  - not feeling dizzy a lot     OBESITY:   Labs done 12/23:  A1C 5.1%    LFTs elevated     GPT (ALT) - 102u/L (10-49)  GOT (AST)- 75u/L (15-37)   Bilirubin normal.     Past LFTs were mildly elevated, but not this elevated before.   Homero does not recall being ill during this time.     Diet: cooks when at school;     ROS: no fever, headache, chest pain, trouble breathing, abdominal pain, constipation, diarrhea, skin issues, joint pain  Sleep is good     Objective   /84 (BP Location: Left arm, Patient Position: Sitting, BP Cuff Size: Large adult)   Pulse 75   Ht 1.73 m (5' 8.11\")   Wt 97.6 kg (215 lb 2.7 oz)   BMI 32.61 kg/m²     Physical Exam:  Physical Exam  General: well appearing male in no distress  HEENT: normocephalic, atraumatic, non-dysmorphic  Thyroid: non-enlarged thyroid gland with no masses, no cervical lymphadenopathy  Neck: no acanthosis  CV: Normal S1, S2, Regular rate and rhythm  Resp: non-labored breathing, clear to auscultation  Abdomen: soft, non tender, no organomegaly   : deferred today   Skin: + dry skin rash on wrists  Neuro: normal tone, grossly normal movements, patellar reflexes normal    Labs:  Testosterone total and free  Lab Results   Component Value Date    [facility] 8.1 (L) 12/28/2024    [facility] 37.8 (L) 12/28/2024     Free T4 1.31      Latest Reference Range & Units 10/13/22 08:56   HDL CHOLESTEROL mg/dL 28.8 !   Cholesterol/HDL Ratio  5.7 !   LDL Calculated 0 - 109 mg/dL 120 (H)   VLDL 0 - 40 mg/dL 15   TRIGLYCERIDES 0 - 149 mg/dL 77   Non HDL Cholesterol 0 - 119 mg/dL 135 (H)   CHOLESTEROL 0 - 199 mg/dL 164   !: Data is abnormal  (H): Data is abnormally high     Latest Reference Range & Units 12/28/21 10:25   GLUCOSE 74 - 99 mg/dL 89   SODIUM 136 - 145 mmol/L 140   POTASSIUM 3.5 - 5.3 " mmol/L 4.2   CHLORIDE 98 - 107 mmol/L 104   Bicarbonate 21 - 32 mmol/L 25   Anion Gap 10 - 20 mmol/L 15   Blood Urea Nitrogen 6 - 23 mg/dL 11   Creatinine 0.50 - 1.30 mg/dL 0.73   Calcium 8.6 - 10.6 mg/dL 10.3   Albumin 3.4 - 5.0 g/dL 4.9   Alkaline Phosphatase 33 - 120 U/L 151 (H)   ALT 10 - 52 U/L 31   AST 9 - 39 U/L 41 (H)   Bilirubin Total 0.0 - 1.2 mg/dL 0.7   (H): Data is abnormally high    Assessment/Plan   Assessment:  Homero Ramsey is a 19 y.o. 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 He is nearing college completion and will likely transition to adult care post graduation.     Plan:   Assessment & Plan  Rathke's cleft cyst (Multi)  Stable rathke cleft cyst (vs cystic adenoma) on MRI. He also has a stable prominence of the extra-axial space behind the cerebellum which merits neurology follow up.   Orders:    Follow Up In Pediatric Neurology; Future    Hypogonadotropic hypogonadism (Multi)    Orders:    Testosterone,Total, LC-MS/MS; Future    Testosterone,Total, LC-MS/MS    Elevated liver enzymes  Differential for etiology of elevated LFTs includes hepatic steatosis, viral hepatitis, and other primary liver etiologies. Will repeat LFTs first and if still elevated will pursue liver ultrasound.     Orders:    Gamma-Glutamyl Transferase; Future    Hepatic Function Panel; Future    Gamma-Glutamyl Transferase    Hepatic Function Panel    Elevated LDL cholesterol level  Most likely related to diet and obesity, set goals for diet and lifestyle change. Declined nutritionist visit today.        Kallman syndrome (Multi)  Hypogonadism is clinically well controlled based on symptoms; however, the total testosterone by MS method done at [facility] [place] returned low. I suspect this may be an assay issue as Vijay has not missed any doses and his testosterone levels have previously been appropriate on IM dosing.   Vijay was  trained to self administer testosterone subcutaneous at a dose of 100mg (0.5mL) once weekly today and will start doing this weekly from now on. His dose of 0.5mL corresponds to 50u on a U100 insulin syringe which was provided in clinic.   Orders:    testosterone cypionate (Depo-Testosterone) 200 mg/mL injection; Inject 0.5ml (100mg) subcutaneous once weekly    Obesity (BMI 30-39.9)  Max has obesity, dyslipidemia, and may have a new diagnosis of hepatic steatosis, which is being worked up. Discussed diet and lifestyle changes to make now to prevent further health issues in the future.   GOALS:   - go to the gym 3 times per week with cardio   - increase vegetable intake to each meal          Central hypothyroidism  Clinically and bio chemically euthyroid.   Orders:    levothyroxine (Synthroid, Levoxyl) 100 mcg tablet; Take 1 tablet (100 mcg) by mouth once daily.      Radha Beck MD  I spent 88 minutes in the care of Homero today.      No

## 2025-01-07 NOTE — ASSESSMENT & PLAN NOTE
Hypogonadism is clinically well controlled based on symptoms; however, the total testosterone by MS method done at [facility] [place] returned low. I suspect this may be an assay issue as Vijay has not missed any doses and his testosterone levels have previously been appropriate on IM dosing.   Vijay was trained to self administer testosterone subcutaneous at a dose of 100mg (0.5mL) once weekly today and will start doing this weekly from now on. His dose of 0.5mL corresponds to 50u on a U100 insulin syringe which was provided in clinic.   Orders:    testosterone cypionate (Depo-Testosterone) 200 mg/mL injection; Inject 0.5ml (100mg) subcutaneous once weekly

## 2025-03-11 LAB
NON-UH HIE ALB: 4.4 G/DL (ref 3.4–5)
NON-UH HIE ALK PHOS: 73 UNIT/L (ref 45–117)
NON-UH HIE BILIRUBIN, DIRECT: 0.22 MG/DL (ref 0–0.4)
NON-UH HIE BILIRUBIN, TOTAL: 0.7 MG/DL (ref 0.3–1.2)
NON-UH HIE GAMMA-GT: 21 UNIT/L (ref 0–73)
NON-UH HIE GOT: 38 UNIT/L (ref 15–37)
NON-UH HIE GPT: 35 UNIT/L (ref 10–49)
NON-UH HIE TOTAL PROTEIN: 7.4 G/DL (ref 5.7–8.2)

## 2025-03-17 LAB
NON-UH HIE TESTOS FREE ADULT MALE ED/LC-MS/MS: 111.1 PG/ML (ref 47–244)
NON-UH HIE TESTOSTERONE TOTAL, LC-MS/MS, MALES: 458.4 NG/DL (ref 300–1080)

## 2025-03-18 NOTE — RESULT ENCOUNTER NOTE
Max's testosterone level is appropriate on Sub-Cutaneous testosterone 100mg once weekly.     Left Max a VM and will inquire about day of week by S[employer] message.

## 2025-04-16 ENCOUNTER — TELEPHONE (OUTPATIENT)
Dept: PEDIATRIC NEUROLOGY | Facility: HOSPITAL | Age: 20
End: 2025-04-16

## 2025-04-16 NOTE — TELEPHONE ENCOUNTER
Telephone Encounter    Name:  Homero Perkinsmarcella  :  [id number]        ----- Message -----   From: Homero Ramsey   Sent: 4/15/2025   3:25 PM EDT   To: [facility] Appointment Request Pool   Subject: Request an Appointment                            Appointment Request From: Homero Ramsey      With Provider: Jonel Sanchez      Preferred Date Range:       Preferred Times: Morning  Afternoon      Reason for visit: Dr. Beck requested I visit you so you can review the last MRI I had in 2024.  My last visit with you was 2021.      Comments:   Request submitted by Homero Ramsey [[id number]] on 4/15/2025 at 3:25:31 PM   Form Title: Request an Appointment   Submitted Data   ----------------------------------------      Appointment Information   Epic Provider Name: Jonel Sanchez   Specialty: Pediatric Neurology   Have you seen this provider in the last 3 years? Yes   Reason for Appointment: Dr. Beck requested I visit you so you can review the last MRI I had in 2024.  My last visit with you was 2021.       Preferred Time of Day: Morning   Afternoon       Preferred Day:        Preferred Location: [facility]       Preferred Zip: [phone]         Request an Appointment  (Newest Message First)  Homero Freeman Appointment Request Pool1 hour ago (10:00 AM)       Thank you.  The office will reach out to me correct?  Thanks       Alyssa Aburto [id number] Cler[facility]2 hours ago (9:39 AM)     Alyssa Ramsey2 hours ago (9:39 AM)     V[facility]  Good Morning Homero,     I'm unable to access Dr. Sanchez schedule, I will proceed to send your information over to his office.     Thank You

## 2025-04-16 NOTE — RESULT ENCOUNTER NOTE
Hi Max,  We checked your liver tests again since they were pretty high last time. They are almost completely normal (ALT is a bit high still, but only 1pt above normal). You may have some hepatic steatosis but since the labs normalized it is not necessary to do the liver Ultrasound, unless you really want to know for sure if you have it. The treatment is healthy eating and exercise.  Please let me know if you want to pursue the ultrasound  Best,  Radha Beck MD

## 2025-04-16 NOTE — TELEPHONE ENCOUNTER
Encounter    Name:  Homero Ramsey  :  [id number]      Received mssg thru MyChart pool - patient requesting appt w/ Dr. Sanchez  Because patient is > 16 y.o. wrote Dr. Sanchez for advisement

## 2025-05-13 DIAGNOSIS — E23.0 HYPOGONADOTROPIC HYPOGONADISM (MULTI): ICD-10-CM

## 2025-05-13 RX ORDER — SYRINGE AND NEEDLE,INSULIN,1ML 31GX15/64"
1 SYRINGE, EMPTY DISPOSABLE MISCELLANEOUS
Qty: 30 EACH | Refills: 11 | Status: SHIPPED | OUTPATIENT
Start: 2025-05-13

## 2025-05-19 DIAGNOSIS — E23.0 KALLMAN SYNDROME (MULTI): ICD-10-CM

## 2025-05-20 RX ORDER — TESTOSTERONE CYPIONATE 200 MG/ML
INJECTION, SOLUTION INTRAMUSCULAR
Qty: 12 ML | Refills: 3 | Status: SHIPPED | OUTPATIENT
Start: 2025-05-20

## 2025-05-23 ENCOUNTER — TELEPHONE (OUTPATIENT)
Dept: PEDIATRIC NEUROLOGY | Facility: HOSPITAL | Age: 20
End: 2025-05-23

## 2025-05-23 DIAGNOSIS — R93.0 ABNORMAL MRI OF HEAD: ICD-10-CM

## 2025-05-23 NOTE — TELEPHONE ENCOUNTER
Telephone Encounter    Name:  Homero Ramsey  :  [id number]      Mother called and lvm. She stated that patient used to be a patient of Dr. Sanchez.  She said that Dr. Beck ordered an MRI in 2024 and said the patient needs to follow up with Dr. Sanchez.  Mom stated they called before for an appointment. Mom said if Dr. Sanchez will not see the son or read the MRI she would like a call back anyway to get a recommendation of who could read the MRI. Mom's number is [phone]     Mom stated pt was last seen by Dr. Sanchez in .  could not find LV in Chart or [employer].  did not schedule because patient is 20 y.o. will wait on advisement from Dr. Sanchez and Clinical.

## 2025-07-18 ENCOUNTER — APPOINTMENT (OUTPATIENT)
Dept: PEDIATRIC ENDOCRINOLOGY | Facility: CLINIC | Age: 20
End: 2025-07-18
Payer: COMMERCIAL

## 2025-07-21 ENCOUNTER — TELEPHONE (OUTPATIENT)
Dept: PEDIATRIC ENDOCRINOLOGY | Facility: HOSPITAL | Age: 20
End: 2025-07-21

## 2025-07-21 DIAGNOSIS — E23.0 HYPOGONADOTROPIC HYPOGONADISM (MULTI): ICD-10-CM

## 2025-07-21 RX ORDER — SYRINGE AND NEEDLE,INSULIN,1ML 31GX15/64"
1 SYRINGE, EMPTY DISPOSABLE MISCELLANEOUS
Qty: 30 EACH | Refills: 11 | Status: SHIPPED | OUTPATIENT
Start: 2025-07-21